# Patient Record
Sex: MALE | Race: WHITE | URBAN - METROPOLITAN AREA
[De-identification: names, ages, dates, MRNs, and addresses within clinical notes are randomized per-mention and may not be internally consistent; named-entity substitution may affect disease eponyms.]

---

## 2021-12-23 ENCOUNTER — OFFICE VISIT (OUTPATIENT)
Dept: ORTHOPEDIC SURGERY | Age: 56
End: 2021-12-23
Payer: COMMERCIAL

## 2021-12-23 VITALS — HEIGHT: 72 IN | WEIGHT: 250 LBS | BODY MASS INDEX: 33.86 KG/M2

## 2021-12-23 DIAGNOSIS — M76.811 ANTERIOR TIBIALIS TENDINITIS OF RIGHT LEG: Primary | ICD-10-CM

## 2021-12-23 PROCEDURE — L1902 AFO ANKLE GAUNTLET PRE OTS: HCPCS | Performed by: ORTHOPAEDIC SURGERY

## 2021-12-23 PROCEDURE — 99203 OFFICE O/P NEW LOW 30 MIN: CPT | Performed by: ORTHOPAEDIC SURGERY

## 2021-12-23 RX ORDER — TAMSULOSIN HYDROCHLORIDE 0.4 MG/1
0.4 CAPSULE ORAL DAILY
COMMUNITY
Start: 2021-04-26

## 2021-12-23 RX ORDER — TROSPIUM CHLORIDE 20 MG/1
TABLET, FILM COATED ORAL
COMMUNITY
Start: 2021-04-26

## 2021-12-23 RX ORDER — ASPIRIN 81 MG/1
81 TABLET ORAL DAILY
COMMUNITY

## 2021-12-23 RX ORDER — BACLOFEN 10 MG/1
TABLET ORAL
COMMUNITY
Start: 2021-12-21

## 2021-12-23 RX ORDER — OCRELIZUMAB 300 MG/10ML
300 INJECTION INTRAVENOUS ONCE
COMMUNITY

## 2021-12-23 RX ORDER — ROSUVASTATIN CALCIUM 10 MG/1
10 TABLET, COATED ORAL DAILY
COMMUNITY
Start: 2021-04-22

## 2021-12-23 RX ORDER — DULOXETIN HYDROCHLORIDE 20 MG/1
20 CAPSULE, DELAYED RELEASE ORAL DAILY
COMMUNITY

## 2021-12-23 RX ORDER — DALFAMPRIDINE 10 MG/1
10 TABLET, FILM COATED, EXTENDED RELEASE ORAL DAILY
COMMUNITY
Start: 2021-02-25

## 2021-12-23 NOTE — PROGRESS NOTES
[facility]  Outpatient Progress Note  Colt Davenport MD    Patient Name: Davidson Gonsalez MRN: <[id number]>   Age: 60 y.o. YOB: 1965   Sex: male      CHIEF COMPLAINT     Chief Complaint   Patient presents with    New Patient     NP Right foot no recent imaging        HISTORY OF PRESENT ILLNESS   Davidson Gonsalez is a 60 y.o. male referred by Dr. Rosario Peck for orthopedic consultation regarding right ankle problem. The patient has a history of multiple sclerosis. He has peripheral neuropathy as a result and a foot drop on the left for which he wears an AFO. Intermittently over the last 9 months or so however he has had sharp pain in the anterior aspect of his right ankle. Symptoms were increased in the last 2 weeks. He feels pain particularly when walking up hills or going upstairs and pain is increased in heel strike phase of gait. There was no specific injury. He has noted no swelling. He has had no previous evaluation or treatment for that problem. Pain Assessment  Location of Pain: Ankle  Location Modifiers: Anterior,Medial,Right  Severity of Pain: 7  Quality of Pain: Sharp  Duration of Pain: A few minutes  Frequency of Pain: Intermittent  Aggravating Factors: Standing,Walking,Exercise (uneven surfaces)  Relieving Factors: Rest  Result of Injury: No    PAST MEDICAL HISTORY    History reviewed. No pertinent past medical history. PAST SURGICAL HISTORY   History reviewed. No pertinent surgical history. MEDICATIONS     Current Outpatient Medications   Medication Sig Dispense Refill    vitamin D (CHOLECALCIFEROL) 79546 UNIT CAPS TAKE 1 CAPSULE (50,000 UNITS TOTAL) BY MOUTH ONCE A WEEK.  aspirin 81 MG EC tablet Take 81 mg by mouth daily      baclofen (LIORESAL) 10 MG tablet TAKE 1 TABLET (10 MG TOTAL) BY MOUTH 2 TIMES A DAY.       Cyanocobalamin 2500 MCG TABS Take by mouth      dalfampridine (AMPYRA) 10 MG extended release tablet Take 10 mg by mouth daily      DULoxetine (CYMBALTA) 20 MG extended release capsule Take 20 mg by mouth daily      rosuvastatin (CRESTOR) 10 MG tablet Take 10 mg by mouth daily      tamsulosin (FLOMAX) 0.4 MG capsule Take 0.4 mg by mouth daily      trospium (SANCTURA) 20 MG tablet Take by mouth      ocrelizumab (OCREVUS) 300 MG/10ML SOLN injection Infuse 300 mg intravenously once q 6 months       No current facility-administered medications for this visit. ALLERGIES     Allergies   Allergen Reactions    Erythromycin        FAMILY HISTORY   History reviewed. No pertinent family history. SOCIAL HISTORY     Social History     Socioeconomic History    Marital status:      Spouse name: None    Number of children: None    Years of education: None    Highest education level: None   Occupational History    None   Tobacco Use    Smoking status: Never Smoker    Smokeless tobacco: Never Used   Substance and Sexual Activity    Alcohol use: None    Drug use: None    Sexual activity: None   Other Topics Concern    None   Social History Narrative    None     Social Determinants of Health     Financial Resource Strain:     Difficulty of Paying Living Expenses: Not on file   Food Insecurity:     Worried About Running Out of Food in the Last Year: Not on file    Re of Food in the Last Year: Not on file   Transportation Needs:     Lack of Transportation (Medical): Not on file    Lack of Transportation (Non-Medical):  Not on file   Physical Activity:     Days of Exercise per Week: Not on file    Minutes of Exercise per Session: Not on file   Stress:     Feeling of Stress : Not on file   Social Connections:     Frequency of Communication with Friends and Family: Not on file    Frequency of Social Gatherings with Friends and Family: Not on file    Attends Confucianist Services: Not on file    Active Member of Clubs or Organizations: Not on file    Attends Club or Organization Meetings: Not on file   [place] Marital Status: Not on file   Intimate Partner Violence:     Fear of Current or Ex-Partner: Not on file    Emotionally Abused: Not on file    Physically Abused: Not on file    Sexually Abused: Not on file   Housing Stability:     Unable to Pay for Housing in the Last Year: Not on file    Number of [place] in the Last Year: Not on file    Unstable Housing in the Last Year: Not on file       REVIEW OF SYSTEMS   General: no fever, chills, night sweats, anorexia, malaise, fatigue, or weight change  Hematologic:  no unexplained bleeding or bruising  HEENT:   no nasal congestion, rhinorrhea, sore throat, or facial pain  Respiratory:  no cough, dyspnea, or chest pain  Cardiovascular:  no angina, JEAN BAPTISTE, PND, orthopnea, dependent edema, or palpitations  Gastrointestinal:  no nausea, vomiting, diarrhea, constipation, or abdominal pain  Genitourinary:  no urinary urgency, frequency, dysuria, or hematuria  Musculoskeletal: see HPI  Endocrine:  no heat or cold intolerance and no polyphagia, polydipsia, or polyuria  Skin:  no skin eruptions or changing lesions  Neurologic:  no focal weakness, numbness/tingling, tremor, or severe headache. See HPI. See HPI for pertinent positives. PHYSICAL EXAM   Vital Signs:   Vitals:    12/23/21 0839   Weight: 250 lb (113.4 kg)   Height: 6' (1.829 m)       General appearance: healthy, alert, no distress  Skin: Skin color, texture, turgor normal. No rashes or lesions  HEENT: atraumatic, normocephalic. PERRL  Respiratory: Unlabored breathing  Lymphatic: No adenopathy   Neuro: Alert and oriented, normal distal sensation, normal bilateral DTRs  Vascular: Normal distal capillary and distal pulses  Muskuloskeletal Exam: Right foot and ankle examination demonstrates no swelling erythema warmth ecchymosis or edema. Weightbearing alignment is normal.  He can heel and toe walk on the right.   He has subjective decreased light touch sensation on the plantar surface of the foot secondary to his known neuropathy. Discomfort is in the region of the tibialis anterior tendon and the anterior ankle and distal to its insertion on the medial cuneiform. No nodularity. No crepitation in the tendon sheath. No palpable defect. Strength in dorsiflexion and supination is normal.    RADIOLOGY   X-rays obtained and reviewed in office:  Views bilateral feet and ankles standing 3 views    Impression: No significant bony abnormality    IMPRESSION     1. Anterior tibialis tendinitis of right leg         PLAN   I had a lengthy discussion with patient today regarding diagnosis and treatment options and recommendations. I recommend activity modification. Fit for a Sisi brace for immobilization and support and to aid in ambulation. Recommend ice and topical diclofenac. OTC Tylenol or ibuprofen as needed. Recommend he modify his exercise routine to avoid inclines and stairs for the time being. We discussed immobilization in a cast boot she has symptoms persist but we will defer at this time so as not to cause additional balance disturbance given his foot drop on the left. FOLLOWUP     Return in about 2 weeks (around 1/6/2022), or if symptoms worsen or fail to improve, for Reevaluation. Orders Placed This Encounter   Procedures    XR FOOT RIGHT (MIN 3 VIEWS)     Standing Status:   Future     Number of Occurrences:   1     Standing Expiration Date:   12/22/2022     Order Specific Question:   Reason for exam:     Answer:   pain    XR ANKLE RIGHT (MIN 3 VIEWS)     Standing Status:   Future     Number of Occurrences:   1     Standing Expiration Date:   12/23/2022     Order Specific Question:   Reason for exam:     Answer:   right foot and ankle pain      No orders of the defined types were placed in this encounter.       Patient was instructed on appropriate use of braces, participation in home exercise programs, healthy lifestyle choices and weight loss as appropriate     Nan Argueta MD